# Patient Record
Sex: MALE | Race: WHITE | Employment: FULL TIME | URBAN - METROPOLITAN AREA
[De-identification: names, ages, dates, MRNs, and addresses within clinical notes are randomized per-mention and may not be internally consistent; named-entity substitution may affect disease eponyms.]

---

## 2017-05-04 ENCOUNTER — APPOINTMENT (OUTPATIENT)
Dept: CT IMAGING | Facility: CLINIC | Age: 57
End: 2017-05-04
Attending: EMERGENCY MEDICINE
Payer: COMMERCIAL

## 2017-05-04 ENCOUNTER — APPOINTMENT (OUTPATIENT)
Dept: GENERAL RADIOLOGY | Facility: CLINIC | Age: 57
End: 2017-05-04
Attending: EMERGENCY MEDICINE
Payer: COMMERCIAL

## 2017-05-04 ENCOUNTER — HOSPITAL ENCOUNTER (EMERGENCY)
Facility: CLINIC | Age: 57
Discharge: HOME | End: 2017-05-04
Attending: EMERGENCY MEDICINE | Admitting: EMERGENCY MEDICINE
Payer: COMMERCIAL

## 2017-05-04 VITALS
OXYGEN SATURATION: 97 % | SYSTOLIC BLOOD PRESSURE: 139 MMHG | HEART RATE: 56 BPM | TEMPERATURE: 97.8 F | WEIGHT: 264 LBS | HEIGHT: 72 IN | DIASTOLIC BLOOD PRESSURE: 75 MMHG | BODY MASS INDEX: 35.76 KG/M2 | RESPIRATION RATE: 18 BRPM

## 2017-05-04 DIAGNOSIS — R42 VERTIGO: ICD-10-CM

## 2017-05-04 LAB
ALBUMIN SERPL-MCNC: 3.7 G/DL (ref 3.4–5)
ALP SERPL-CCNC: 85 U/L (ref 40–150)
ALT SERPL W P-5'-P-CCNC: 74 U/L (ref 0–70)
ANION GAP SERPL CALCULATED.3IONS-SCNC: 4 MMOL/L (ref 3–14)
AST SERPL W P-5'-P-CCNC: 46 U/L (ref 0–45)
BASOPHILS # BLD AUTO: 0 10E9/L (ref 0–0.2)
BASOPHILS NFR BLD AUTO: 0.5 %
BILIRUB SERPL-MCNC: 1.4 MG/DL (ref 0.2–1.3)
BUN SERPL-MCNC: 18 MG/DL (ref 7–30)
CALCIUM SERPL-MCNC: 8.9 MG/DL (ref 8.5–10.1)
CHLORIDE SERPL-SCNC: 105 MMOL/L (ref 94–109)
CO2 SERPL-SCNC: 27 MMOL/L (ref 20–32)
CREAT SERPL-MCNC: 1 MG/DL (ref 0.66–1.25)
DIFFERENTIAL METHOD BLD: ABNORMAL
EOSINOPHIL # BLD AUTO: 0.2 10E9/L (ref 0–0.7)
EOSINOPHIL NFR BLD AUTO: 3 %
ERYTHROCYTE [DISTWIDTH] IN BLOOD BY AUTOMATED COUNT: 16 % (ref 10–15)
GFR SERPL CREATININE-BSD FRML MDRD: 77 ML/MIN/1.7M2
GLUCOSE SERPL-MCNC: 103 MG/DL (ref 70–99)
HCT VFR BLD AUTO: 41.3 % (ref 40–53)
HGB BLD-MCNC: 13 G/DL (ref 13.3–17.7)
IMM GRANULOCYTES # BLD: 0 10E9/L (ref 0–0.4)
IMM GRANULOCYTES NFR BLD: 0.2 %
INTERPRETATION ECG - MUSE: NORMAL
LYMPHOCYTES # BLD AUTO: 2.1 10E9/L (ref 0.8–5.3)
LYMPHOCYTES NFR BLD AUTO: 36.1 %
MCH RBC QN AUTO: 24.4 PG (ref 26.5–33)
MCHC RBC AUTO-ENTMCNC: 31.5 G/DL (ref 31.5–36.5)
MCV RBC AUTO: 78 FL (ref 78–100)
MONOCYTES # BLD AUTO: 0.8 10E9/L (ref 0–1.3)
MONOCYTES NFR BLD AUTO: 13.7 %
NEUTROPHILS # BLD AUTO: 2.7 10E9/L (ref 1.6–8.3)
NEUTROPHILS NFR BLD AUTO: 46.5 %
NRBC # BLD AUTO: 0 10*3/UL
NRBC BLD AUTO-RTO: 0 /100
PLATELET # BLD AUTO: 243 10E9/L (ref 150–450)
POTASSIUM SERPL-SCNC: 4.2 MMOL/L (ref 3.4–5.3)
PROT SERPL-MCNC: 7.4 G/DL (ref 6.8–8.8)
RBC # BLD AUTO: 5.32 10E12/L (ref 4.4–5.9)
SODIUM SERPL-SCNC: 136 MMOL/L (ref 133–144)
TROPONIN I SERPL-MCNC: NORMAL UG/L (ref 0–0.04)
TSH SERPL DL<=0.05 MIU/L-ACNC: 1.1 MU/L (ref 0.4–4)
WBC # BLD AUTO: 5.7 10E9/L (ref 4–11)

## 2017-05-04 PROCEDURE — 93005 ELECTROCARDIOGRAM TRACING: CPT

## 2017-05-04 PROCEDURE — 71020 XR CHEST 2 VW: CPT

## 2017-05-04 PROCEDURE — 99285 EMERGENCY DEPT VISIT HI MDM: CPT | Mod: 25

## 2017-05-04 PROCEDURE — 70450 CT HEAD/BRAIN W/O DYE: CPT | Mod: XS

## 2017-05-04 PROCEDURE — 70498 CT ANGIOGRAPHY NECK: CPT

## 2017-05-04 PROCEDURE — 25000128 H RX IP 250 OP 636: Performed by: EMERGENCY MEDICINE

## 2017-05-04 PROCEDURE — 84443 ASSAY THYROID STIM HORMONE: CPT | Performed by: EMERGENCY MEDICINE

## 2017-05-04 PROCEDURE — 25000131 ZZH RX MED GY IP 250 OP 636 PS 637: Performed by: EMERGENCY MEDICINE

## 2017-05-04 PROCEDURE — 80053 COMPREHEN METABOLIC PANEL: CPT | Performed by: EMERGENCY MEDICINE

## 2017-05-04 PROCEDURE — 25500064 ZZH RX 255 OP 636: Performed by: EMERGENCY MEDICINE

## 2017-05-04 PROCEDURE — 85025 COMPLETE CBC W/AUTO DIFF WBC: CPT | Performed by: EMERGENCY MEDICINE

## 2017-05-04 PROCEDURE — 84484 ASSAY OF TROPONIN QUANT: CPT | Performed by: EMERGENCY MEDICINE

## 2017-05-04 RX ORDER — MECLIZINE HYDROCHLORIDE 25 MG/1
25 TABLET ORAL ONCE
Status: COMPLETED | OUTPATIENT
Start: 2017-05-04 | End: 2017-05-04

## 2017-05-04 RX ORDER — IOPAMIDOL 755 MG/ML
500 INJECTION, SOLUTION INTRAVASCULAR ONCE
Status: COMPLETED | OUTPATIENT
Start: 2017-05-04 | End: 2017-05-04

## 2017-05-04 RX ORDER — ONDANSETRON 2 MG/ML
4 INJECTION INTRAMUSCULAR; INTRAVENOUS EVERY 30 MIN PRN
Status: DISCONTINUED | OUTPATIENT
Start: 2017-05-04 | End: 2017-05-04 | Stop reason: HOSPADM

## 2017-05-04 RX ORDER — MECLIZINE HCL 12.5 MG 12.5 MG/1
12.5 TABLET ORAL 4 TIMES DAILY PRN
Qty: 30 TABLET | Refills: 0 | Status: SHIPPED | OUTPATIENT
Start: 2017-05-04

## 2017-05-04 RX ORDER — ONDANSETRON 4 MG/1
4 TABLET, ORALLY DISINTEGRATING ORAL EVERY 8 HOURS PRN
Qty: 10 TABLET | Refills: 0 | Status: SHIPPED | OUTPATIENT
Start: 2017-05-04 | End: 2017-05-07

## 2017-05-04 RX ORDER — LIDOCAINE 40 MG/G
CREAM TOPICAL
Status: DISCONTINUED | OUTPATIENT
Start: 2017-05-04 | End: 2017-05-04 | Stop reason: HOSPADM

## 2017-05-04 RX ADMIN — MECLIZINE HYDROCHLORIDE 25 MG: 25 TABLET ORAL at 09:18

## 2017-05-04 RX ADMIN — SODIUM CHLORIDE 80 ML: 9 INJECTION, SOLUTION INTRAVENOUS at 09:39

## 2017-05-04 RX ADMIN — SODIUM CHLORIDE 1000 ML: 9 INJECTION, SOLUTION INTRAVENOUS at 11:15

## 2017-05-04 RX ADMIN — IOPAMIDOL 70 ML: 755 INJECTION, SOLUTION INTRAVENOUS at 09:39

## 2017-05-04 ASSESSMENT — ENCOUNTER SYMPTOMS
HEADACHES: 0
SHORTNESS OF BREATH: 0
CHILLS: 0
NUMBNESS: 0
VOMITING: 0
WEAKNESS: 0
DIZZINESS: 1
FEVER: 0

## 2017-05-04 NOTE — ED AVS SNAPSHOT
[facility] Emergency Department    [address] [place] [address]    Phone:  [phone]    Fax:  [phone]                                       Weston Wilkins   MRN: [id number]    Department:  [facility] Emergency Department   Date of Visit:  5/4/2017           Patient Information     Date Of Birth          1960        Your diagnoses for this visit were:     Vertigo        You were seen by Larissa Hernández MD.      Follow-up Information     Follow up with Souleymane Piper DO. Go in 5 days.    Specialty:  Family Practice    Contact information:    [facility]  [address]  [facility] [address] [phone]          Discharge Instructions         Vertigo (Unknown Cause)    In addition to helping with hearing, the inner ear is part of the balance center of your body. Problems with the inner ear can a false feeling of motion. This is called vertigo. Often, it feels as if you or the room is spinning. A vertigo attack may cause sudden nausea, vomiting and heavy sweating. Severe vertigo causes a loss of balance and can cause you to fall. During vertigo, small head movements and changes in body position will often make the symptoms worse. You may also have ringing in the ears called tinnitus.  An episode of vertigo may last seconds, minutes or hours. Once you are over the first episode, it may never come back. However, symptoms may return off and on.  The cause of your vertigo is not yet known. Possible causes of vertigo include:    Inflammation of the inner ear    Disease of the nerves to the inner ear    Movement of calcium particles in the inner ear    Poor blood flow to the balance centers of the brain    Migraine headaches  Home care    If symptoms are severe, rest quietly in bed. Change positions very slowly. There is usually one position that will feel best, such as lying on one side or lying on your back with your head slightly  raised on pillows.    Do not drive a car or work with dangerous machinery until symptoms have been gone for at least one week.    Take medicine as prescribed to relieve your symptoms. Unless another medicine was prescribed for symptoms of nausea, vomiting, and dizziness, you may use over-the-counter motion sickness pills. Ask your pharmacist for suggestions.  Follow-up care  Follow up with your healthcare provider or as directed. If you are referred to a specialist or for testing, make the appointment promptly.  When to seek medical advice  Call your healthcare provider if any of the following occur:    Fever of 100.4 F (38 C) or higher, or as directed by your healthcare provider    Vertigo worsens or is not controlled by prescribed medicine     Repeated vomiting not relieved by prescribed medicine     Severe headache    Confusion    Weakness of an arm or leg or one side of the face    Difficulty with speech or vision    Loss of consciousness     Seizure    4029-1534 [employer]. [address] [place] [address]. All rights reserved. This information is not intended as a substitute for professional medical care. Always follow your healthcare professional's instructions.          24 Hour Appointment Hotline       To make an appointment at any [place] clinic, call [phone] ([phone]). If you don't have a family doctor or clinic, we will help you find one. [place] clinics are conveniently located to serve the needs of you and your family.          ED Discharge Orders     PHYSICAL THERAPY REFERRAL       *This therapy referral will be filtered to a centralized scheduling office at [facility] and the patient will receive a call to schedule an appointment at a [place] location most convenient for them. *     [facility] provides Physical Therapy evaluation and treatment and many specialty services across the [place] system.  If requesting a  specialty program, please choose from the list below.    If you have not heard from the scheduling office within 2 business days, please call [phone] for all locations, with the exception of [facility], please call [phone].  Treatment: Evaluation & Treatment  Special Instructions/Modalities: none  Special Programs: Balance/Vestibular    Please be aware that coverage of these services is subject to the terms and limitations of your health insurance plan.  Call member services at your health plan with any benefit or coverage questions.      **Note to Provider:  If you are referring outside of [place] for the therapy appointment, please list the name of the location in the  special instructions  above, print the referral and give to the patient to schedule the appointment.                     Review of your medicines      START taking        Dose / Directions Last dose taken    meclizine 12.5 MG tablet   Commonly known as:  ANTIVERT   Dose:  12.5 mg   Quantity:  30 tablet        Take 1 tablet (12.5 mg) by mouth 4 times daily as needed for dizziness   Refills:  0        ondansetron 4 MG ODT tab   Commonly known as:  ZOFRAN ODT   Dose:  4 mg   Quantity:  10 tablet        Take 1 tablet (4 mg) by mouth every 8 hours as needed for nausea   Refills:  0          Our records show that you are taking the medicines listed below. If these are incorrect, please call your family doctor or clinic.        Dose / Directions Last dose taken    ALPRAZOLAM PO        Refills:  0        amoxicillin-clavulanate 1000-62.5 MG per 12 hr tablet   Commonly known as:  AUGMENTIN XR   Dose:  2 tablet        Take 2 tablets by mouth 2 times daily   Refills:  0        CITALOPRAM HYDROBROMIDE PO        Refills:  0        LISINOPRIL PO        Refills:  0        OMEPRAZOLE PO        Refills:  0        ZOLPIDEM TARTRATE PO        Refills:  0                Prescriptions were sent or printed at these locations (2 Prescriptions)                    Other Prescriptions                Printed at Department/Unit printer (2 of 2)         ondansetron (ZOFRAN ODT) 4 MG ODT tab               meclizine (ANTIVERT) 12.5 MG tablet                Procedures and tests performed during your visit     CBC with platelets differential    CT Head Neck Angio w/o & w Contrast    CT Head w/o Contrast    Chest XR,  PA & LAT    Comprehensive metabolic panel    EKG 12 lead    TSH    Troponin I      Orders Needing Specimen Collection     None      Pending Results     No orders found from 5/2/2017 to 5/5/2017.            Pending Culture Results     No orders found from 5/2/2017 to 5/5/2017.            Pending Results Instructions     If you had any lab results that were not finalized at the time of your Discharge, you can call the ED Lab Result RN at [phone]. You will be contacted by this team for any positive Lab results or changes in treatment. The nurses are available 7 days a week from 10A to 6:30P.  You can leave a message 24 hours per day and they will return your call.        Test Results From Your Hospital Stay        5/4/2017  9:14 AM      Component Results     Component Value Ref Range & Units Status    WBC 5.7 4.0 - 11.0 10e9/L Final    RBC Count 5.32 4.4 - 5.9 10e12/L Final    Hemoglobin 13.0 (L) 13.3 - 17.7 g/dL Final    Hematocrit 41.3 40.0 - 53.0 % Final    MCV 78 78 - 100 fl Final    MCH 24.4 (L) 26.5 - 33.0 pg Final    MCHC 31.5 31.5 - 36.5 g/dL Final    RDW 16.0 (H) 10.0 - 15.0 % Final    Platelet Count 243 150 - 450 10e9/L Final    Diff Method Automated Method  Final    % Neutrophils 46.5 % Final    % Lymphocytes 36.1 % Final    % Monocytes 13.7 % Final    % Eosinophils 3.0 % Final    % Basophils 0.5 % Final    % Immature Granulocytes 0.2 % Final    Nucleated RBCs 0 0 /100 Final    Absolute Neutrophil 2.7 1.6 - 8.3 10e9/L Final    Absolute Lymphocytes 2.1 0.8 - 5.3 10e9/L Final    Absolute Monocytes 0.8 0.0 - 1.3 10e9/L Final    Absolute Eosinophils 0.2 0.0 -  0.7 10e9/L Final    Absolute Basophils 0.0 0.0 - 0.2 10e9/L Final    Abs Immature Granulocytes 0.0 0 - 0.4 10e9/L Final    Absolute Nucleated RBC 0.0  Final         5/4/2017  9:33 AM      Component Results     Component Value Ref Range & Units Status    Sodium 136 133 - 144 mmol/L Final    Potassium 4.2 3.4 - 5.3 mmol/L Final    Chloride 105 94 - 109 mmol/L Final    Carbon Dioxide 27 20 - 32 mmol/L Final    Anion Gap 4 3 - 14 mmol/L Final    Glucose 103 (H) 70 - 99 mg/dL Final    Urea Nitrogen 18 7 - 30 mg/dL Final    Creatinine 1.00 0.66 - 1.25 mg/dL Final    GFR Estimate 77 >60 mL/min/1.7m2 Final    Non  GFR Calc    GFR Estimate If Black >90   GFR Calc   >60 mL/min/1.7m2 Final    Calcium 8.9 8.5 - 10.1 mg/dL Final    Bilirubin Total 1.4 (H) 0.2 - 1.3 mg/dL Final    Albumin 3.7 3.4 - 5.0 g/dL Final    Protein Total 7.4 6.8 - 8.8 g/dL Final    Alkaline Phosphatase 85 40 - 150 U/L Final    ALT 74 (H) 0 - 70 U/L Final    AST 46 (H) 0 - 45 U/L Final         5/4/2017  9:33 AM      Component Results     Component Value Ref Range & Units Status    Troponin I ES  0.000 - 0.045 ug/L Final    <0.015  The 99th percentile for upper reference range is 0.045 ug/L.  Troponin values in   the range of 0.045 - 0.120 ug/L may be associated with risks of adverse   clinical events.           5/4/2017  9:59 AM      Narrative     CT SCAN OF THE HEAD WITHOUT CONTRAST   5/4/2017 9:50 AM     HISTORY: New onset dizziness    TECHNIQUE:  Axial images of the head and coronal reformations without  IV contrast material.  Radiation dose for this scan was reduced using  automated exposure control, adjustment of the mA and/or kV according  to patient size, or iterative reconstruction technique.    COMPARISON: None.    FINDINGS:  The ventricles are normal in size, shape and configuration.   The brain parenchyma and subarachnoid spaces are normal. There is no  evidence of intracranial hemorrhage, mass, acute infarct or  anomaly.     The visualized portions of the sinuses and mastoids appear normal.  There is no evidence of trauma.        Impression     IMPRESSION: Normal CT scan of the head.      DAVIDA CALI MD         5/4/2017 11:16 AM      Narrative     CTA ANGIOGRAM HEAD AND NECK  5/4/2017 9:56 AM     HISTORY: Dizziness.    TECHNIQUE: Axial images were obtained through the head and neck  without and with intravenous contrast. 70 mL of Isovue-370 was given.  Multiplanar reconstructions were performed 3-D reconstructions off a  remote workstation for CT angiography were also acquired. Carotid  stenoses were evaluated by comparing the caliber of the proximal  internal carotid artery to the caliber of the distal internal carotid  artery. Radiation dose for this scan was reduced using automated  exposure control, adjustment of the mA and/or kV according to patient  size, or iterative reconstruction technique.    FINDINGS:    Brachiocephalic vessels: Normal.    Right carotid system: Normal.    Left carotid system: Normal.    Right vertebral artery: Normal.    Left vertebral artery: Normal.    [place]: Normal. Incidental note is made of an atretic right  A1 segment which is an anatomic variant.    Other findings: None.        Impression     IMPRESSION: Negative CT angiography of the head and neck.    DAVIDA CALI MD         5/4/2017  9:56 AM      Narrative     XR CHEST 2 VW 5/4/2017 9:51 AM    COMPARISON: 12/16/2015    HISTORY: Chest pain        Impression     IMPRESSION: Cardiac silhouette and pulmonary vasculature are within  normal limits. No focal airspace disease, pleural effusion or  pneumothorax.    JUAN M MCKEON         5/4/2017 10:52 AM      Component Results     Component Value Ref Range & Units Status    TSH 1.10 0.40 - 4.00 mU/L Final                Clinical Quality Measure: Blood Pressure Screening     Your blood pressure was checked while you were in the emergency department today. The last reading we obtained  "was  BP: 146/81 . Please read the guidelines below about what these numbers mean and what you should do about them.  If your systolic blood pressure (the top number) is less than 120 and your diastolic blood pressure (the bottom number) is less than 80, then your blood pressure is normal. There is nothing more that you need to do about it.  If your systolic blood pressure (the top number) is 120-139 or your diastolic blood pressure (the bottom number) is 80-89, your blood pressure may be higher than it should be. You should have your blood pressure rechecked within a year by a primary care provider.  If your systolic blood pressure (the top number) is 140 or greater or your diastolic blood pressure (the bottom number) is 90 or greater, you may have high blood pressure. High blood pressure is treatable, but if left untreated over time it can put you at risk for heart attack, stroke, or kidney failure. You should have your blood pressure rechecked by a primary care provider within the next 4 weeks.  If your provider in the emergency department today gave you specific instructions to follow-up with your doctor or provider even sooner than that, you should follow that instruction and not wait for up to 4 weeks for your follow-up visit.        Thank you for choosing [facility]       Thank you for choosing [facility] for your care. Our goal is always to provide you with excellent care. Hearing back from our patients is one way we can continue to improve our services. Please take a few minutes to complete the written survey that you may receive in the mail after you visit with us. Thank you!        [employer] Information     [employer] lets you send messages to your doctor, view your test results, renew your prescriptions, schedule appointments and more. To sign up, go to [email or web address] . Click on \"Log in\" on the left side of the screen, which will take you to the Welcome page. Then click on \"Sign up Now\" on the right side " of the page.     You will be asked to enter the access code listed below, as well as some personal information. Please follow the directions to create your username and password.     Your access code is: [email or web address]  Expires: 2017 11:43 AM     Your access code will  in 90 days. If you need help or a new code, please call your [place] clinic or [phone].        Care EveryWhere ID     This is your Care EveryWhere ID. This could be used by other organizations to access your [place] medical records  [id number]        After Visit Summary       This is your record. Keep this with you and show to your community pharmacist(s) and doctor(s) at your next visit.

## 2017-05-04 NOTE — ED PROVIDER NOTES
"  History     Chief Complaint:  Dizziness    HPI   Weston Wilkins is a 57 year old male with history of anxiety and depression who presents with a female  for evaluation of dizziness. The patient states that yesterday evening before going to bed around 0800 he experienced an episode of \"room spinning dizziness,\" which lasted for approximately 30 minutes. He states that these symptoms did not really resolve, but he simply went to bed. Before going to bed he took some percocet prescribed for a \"neck spur.\" Upon waking he states that he did not feel dizzy and was altogether back at baseline.     While driving to work about 1.5 PTA at 0715 however, his dizziness returned and he felt unsafe to drive. He pulled over on the side of the road and called EMS. This sensation lasted for 4-5 minutes and resolved spontaneously. While he was feeling dizzy he noted that the right side of his lower lip was tingling, but no similar sensation to his right upper lip, tongue, or extremities. The right side of his lower lip has continued and he does endorses recurrent dizziness when ambulating or twisting his head. The patient otherwise denies any headache, vision changes, chest pain, speech difficulty, confusion, shortness of breath, nausea, vomiting, or numbness, tingling, or weakness to his extremities.    Allergies:  Duloxetine  Sulfa drugs      Medications:    Lisinopril  Citalopram   Omeprazole  Zolpidem   Alprazolam   Augmentin XR    Past Medical History:    Anxiety   Depression   Hypertension  Hemochromatosis     Past Surgical History:    The patient does not have any pertinent past surgical history.     Family History:    No past pertinent family history.     Social History:  Negative for tobacco use.  Positive for alcohol use.    Marital Status:   [2]    Review of Systems   Constitutional: Negative for chills and fever.   Respiratory: Negative for shortness of breath.    Cardiovascular: Negative for chest " pain.   Gastrointestinal: Negative for vomiting.   Neurological: Positive for dizziness. Negative for syncope, weakness, numbness and headaches.   All other systems reviewed and are negative.    Physical Exam   First Vitals:  BP: (!) 143/96  Pulse: 56  Temp: 97.8  F (36.6  C)  Resp: 18  Height: 182.9 cm (6')  Weight: 119.7 kg (264 lb)  SpO2: 98 %      Physical Exam   Constitutional: He appears well-developed and well-nourished.   HENT:   Right Ear: External ear normal.   Left Ear: External ear normal.   Mouth/Throat: Oropharynx is clear and moist. No oropharyngeal exudate.   TM's clear bilaterally   Eyes: Conjunctivae are normal. Pupils are equal, round, and reactive to light. No scleral icterus.   Neck: Normal range of motion. Neck supple.   Cardiovascular: Normal rate, regular rhythm, normal heart sounds and intact distal pulses.  Exam reveals no gallop and no friction rub.    No murmur heard.  Pulmonary/Chest: Effort normal and breath sounds normal. No respiratory distress. He has no wheezes. He has no rales.   Abdominal: Soft. Bowel sounds are normal. He exhibits no distension and no mass. There is no tenderness.   Musculoskeletal: Normal range of motion. He exhibits no edema.   Lymphadenopathy:     He has no cervical adenopathy.   Neurological: He is alert. He has normal reflexes. No cranial nerve deficit.   Speech clear  5/5 strength x 4  Sensation to light touch normal in extremities  Neg drift  No cerebellar finidng   Skin: Skin is warm and dry. No rash noted.   Psychiatric: He has a normal mood and affect.     Emergency Department Course   ECG:  Indication: dizziness   Time: 0830  Vent. Rate 53 bpm. TN interval 152. QRS duration 98. QT/QTc 404/379. P-R-T axis 47 69 33. Sinus bradycardia. Otherwise normal ECG. Read time: 0836.      Imaging:  Radiographic findings were communicated with the patient who voiced understanding of the findings.    XR Chest (PA and LAT):   Cardiac silhouette and pulmonary  vasculature are within  normal limits. No focal airspace disease, pleural effusion or  pneumothorax. As per radiology.      CT Head without contrast:   Normal CT scan of the head. As per radiology.    CT Head/Neck angiogram:   Negative CT angiography of the head and neck. As per radiology.    Laboratory:  CBC: WBC: 5.7, HGB: 13.0(L), PLT: 243   CMP: Glucose 103(H), ALT 74(H), AST 46(H), o/w WNL (Creatinine: 1.00)     Troponin: <0.015    Interventions:  NS 1L IV   Meclizine 25 mg oral     Emergency Department Course:  Nursing notes and vitals reviewed. I performed an exam of the patient as documented above.      Blood drawn. This was sent to the lab for further testing, results above.    The patient was sent for a CXR, head/neck CT/CTA while in the emergency department, findings above.      0949 The patient was ambulated here in the emergency department without difficulty or dizziness.      1002 I reevaluated the patient and provided an update in regards to his ED course.       Findings and plan explained to the Patient. Patient discharged home with instructions regarding supportive care, medications, and reasons to return. The importance of close follow-up was reviewed.     I personally reviewed the laboratory results with the Patient and answered all related questions prior to discharge.      Impression & Plan    Medical Decision Making:  Weston Wilkins is a 57 year old male who presents with an intermittent spinning sensation and nausea; currently symptoms are not as severe and nausea has somewhat subsided. He did not have any neurologic deficits. He has subjective paraesthesias to the right lower lip. There was no paresthesias to other body parts. His symptoms resolved with Zofran and Meclizine here, he did have one episode of dizziness in the bathroom again, but when he returned his symptoms resolved. He does have a history of neck problems and we did obtain a CT/CTA to rule out dissection. So far things are  negative, he is reassured, and is sent to physical therapy for vestibular therapy. Also recommended follow up with his primary care physician. The patient will otherwise feels comfortable going home with a course of Zofran and meclizine; should symptoms worsen he is asked to return to the ED.     Diagnosis:  1. (R42) BPPV     Discharge Medications:  New Prescriptions    MECLIZINE (ANTIVERT) 12.5 MG TABLET    Take 1 tablet (12.5 mg) by mouth 4 times daily as needed for dizziness    ONDANSETRON (ZOFRAN ODT) 4 MG ODT TAB    Take 1 tablet (4 mg) by mouth every 8 hours as needed for nausea     Gil PARKER, am serving as a scribe on 5/4/2017 at 8:37 AM to personally document services performed by Larissa Hernández MD based on my observations and the provider's statements to me.     5/4/2017   [facility] EMERGENCY DEPARTMENT       Larissa Hernández MD  05/04/17 [id number]

## 2017-05-04 NOTE — ED NOTES
Pt presents with multiple complaints. Pt states he had some dizziness last night. Today the dizziness has gotten a lot worse. Pt is c/o nausea and right lower lip numbness. Pt states he has a neck spur and always has neck/shoulder pain and wonders if the numbness is related. Pt is A&O, ABC's intact.

## 2017-05-04 NOTE — DISCHARGE INSTRUCTIONS
Vertigo (Unknown Cause)    In addition to helping with hearing, the inner ear is part of the balance center of your body. Problems with the inner ear can a false feeling of motion. This is called vertigo. Often, it feels as if you or the room is spinning. A vertigo attack may cause sudden nausea, vomiting and heavy sweating. Severe vertigo causes a loss of balance and can cause you to fall. During vertigo, small head movements and changes in body position will often make the symptoms worse. You may also have ringing in the ears called tinnitus.  An episode of vertigo may last seconds, minutes or hours. Once you are over the first episode, it may never come back. However, symptoms may return off and on.  The cause of your vertigo is not yet known. Possible causes of vertigo include:    Inflammation of the inner ear    Disease of the nerves to the inner ear    Movement of calcium particles in the inner ear    Poor blood flow to the balance centers of the brain    Migraine headaches  Home care    If symptoms are severe, rest quietly in bed. Change positions very slowly. There is usually one position that will feel best, such as lying on one side or lying on your back with your head slightly raised on pillows.    Do not drive a car or work with dangerous machinery until symptoms have been gone for at least one week.    Take medicine as prescribed to relieve your symptoms. Unless another medicine was prescribed for symptoms of nausea, vomiting, and dizziness, you may use over-the-counter motion sickness pills. Ask your pharmacist for suggestions.  Follow-up care  Follow up with your healthcare provider or as directed. If you are referred to a specialist or for testing, make the appointment promptly.  When to seek medical advice  Call your healthcare provider if any of the following occur:    Fever of 100.4 F (38 C) or higher, or as directed by your healthcare provider    Vertigo worsens or is not controlled  by prescribed medicine     Repeated vomiting not relieved by prescribed medicine     Severe headache    Confusion    Weakness of an arm or leg or one side of the face    Difficulty with speech or vision    Loss of consciousness     Seizure    8917-8003 [employer]. [address] [place] [address], [place] [address]. All rights reserved. This information is not intended as a substitute for professional medical care. Always follow your healthcare professional's instructions.

## 2017-05-08 ENCOUNTER — HOSPITAL ENCOUNTER (OUTPATIENT)
Dept: PHYSICAL THERAPY | Facility: CLINIC | Age: 57
Setting detail: THERAPIES SERIES
End: 2017-05-08
Attending: EMERGENCY MEDICINE
Payer: COMMERCIAL

## 2017-05-08 PROCEDURE — 40000840 ZZHC STATISTIC PT VESTIBULAR VISIT: Performed by: PHYSICAL THERAPIST

## 2017-05-08 PROCEDURE — 97162 PT EVAL MOD COMPLEX 30 MIN: CPT | Mod: GP | Performed by: PHYSICAL THERAPIST

## 2017-05-08 NOTE — PROGRESS NOTES
" 05/08/17 1000   Quick Adds   Quick Adds Vestibular Eval   Type of Visit Initial OP PT Evaluation   General Information   Start of Care Date 05/08/17   Referring Physician Larissa Hernández MD   Orders Evaluate and Treat as Indicated   Order Date 05/04/17   Medical Diagnosis vertigo   Onset of illness/injury or Date of Surgery 05/03/17   Surgical/Medical history reviewed Yes   Pertinent history of current vestibular problem (include personal factors and/or comorbidities that impact the POC)  Anxiety;Depression;Migraines;Prior concussion(s)   Pertinent history of current problem (include personal factors and/or comorbidities that impact the POC) Patient reports he had a HA on Wednesday night and a \"dizzy spell.\" 'The next morning while driving to work he was feeling dizzy in waves. Eventually he had to pull off the road and called 911 as he thought he was having a stroke. He was seen in the ED. CT scan and roya was negative. He was diagnosed with vertigo and discharged home with a script for Meclizine, Zofran and PT for vestibular rehab. He has been taking the Meclizine every 6 hours since but has not been taking the Zofran. He is not sure if it is helping. He is sleeping alot, unable to work or drive. He denies recent medication changes or recent illness. He reports hearing has not changed but has had a couple epsiodes of ringing. His balance seems to be off constantly, worse during the vertigo spells. He is walking with assist of walls and furnature. He report several \"big ones\" when describing epsiodes of vertigo, each day lasting mins at a time. He does have a h/o a bone spur in his C-spine that was not removed during his decompression surgery in 2009 as it was reportedly too close to the nerve. He wonders if this is causing his vertigo.   Pertinent Visual History  no recent vision changes, wears \"cheaters\" for reading only. Has had lasik in past.   Prior level of function comment independent with all mobility, no " AD   Diagnostic Tests CT Scan   CT Results unremarkable   Previous/Current Treatment Medication(s)   Improvement after medication Other  (patient not sure it is helping much)   Current Community Support Family/friend caregiver  (spouse)   Patient role/Employment history Employed  (manufacturing plant, on computer. off work now d/t vertigo.)   Living environment House/[facility]   Home/Community Accessibility Comments stairs ok with support. Not driving now due to dizziness.   Current Assistive Devices (none)   ADL Devices (none)   Patient/Family Goals Statement resolve dizziness, improve balance, return to work, driving   Fall Risk Screen   Fall screen completed by PT   Per patient - Fall 2 or more times in past year? No   Per patient - Fall with injury in past year? No   Is patient a fall risk? Yes   Functional Scales   Functional Scales and Outcomes DHI 66/100   Pain   Patient currently in pain Yes   Pain location neck   Pain rating 4/10   Additional pain locations? Pain location 2   Pain location 2 shoulder B   Pain rating 2 4/10   Cognitive Status Examination   Orientation orientation to person, place and time   Level of Consciousness alert   Follows Commands and Answers Questions 100% of the time   Personal Safety and Judgment intact   Posture   Posture Forward head position   Strength   Strength Comments C6 on L 4/5 all other cervical myotomes strong, 5/5, B   Bed Mobility   Bed Mobility Comments independent   Transfer Skills   Transfer Comments independent   Gait   Gait Comments slow, guarded with short step length B. Landing with foot flat and at times toe strike at initial contact   Gait Special Tests   Gait Special Tests DYNAMIC GAIT INDEX   Gait Special Tests Dynamic Gait Index   Comments 5/12 on 4 item DGI with 9 or less indicating increased fall risk   Sensory Examination   Sensory Perception no deficits were identified   Coordination   Coordination no deficits were identified   Cervicogenic Screen   Neck  ROM sufficient for positional testing   Oculomotor Exam   Smooth Pursuit Normal   Smooth Pursuit Comment some blurring, per pt   Saccades Normal   VOR Normal   Rapid Head Thrust Normal   Infrared Goggle Exam or Frenzel Lense Exam   Vestibular Suppressant in Last 24 Hours? Yes  (last dose at 6AM, taking every 6 hours)   Exam completed with Infrared Goggles   Spontaneous Nystagmus Other   Gaze Evoked Nystagmus Horizontal R   Gaze Evoked Nystagmus comments with R gaze only   Head Shake Horizontal Nystagmus Negative   [place]-Hallpike (right) Horizontal R   [place]-Hallpike (right) comments asymptomatic   [place]-Hallpike (Left) Negative   HSCC Supine Roll Test (Right) Negative   HSCC Supine Roll Test (Left) Negative   Planned Therapy Interventions   Planned Therapy Interventions balance training;neuromuscular re-education;other (see comments)   Planned Therapy Interventions Comment CRM if indicated   Clinical Impression   Criteria for Skilled Therapeutic Interventions Met yes, treatment indicated   PT Diagnosis gait instability, vertigo   Influenced by the following impairments intermittent dizziness, imbalance   Functional limitations due to impairments transfers, gait, work tasks, driving, participation in social activities, household activities   Clinical Presentation Evolving/Changing   Clinical Presentation Rationale evolving symptoms, vary without clear trigger    Clinical Decision Making (Complexity) Moderate complexity   Therapy Frequency 2 times/Week  (reduced frequency as indicated)   Predicted Duration of Therapy Intervention (days/wks) 4 weeks   Risk & Benefits of therapy have been explained Yes   Patient, Family & other staff in agreement with plan of care Yes   Clinical Impression Comments Unable to reproduce vertigo in session today with negative IR and oculomotor exam. It is possible that s/s are being masked by meclizine. Patient will be re-assessed with IR at upcoming appt to more definitively rule in or out the  "vestibular system.   Education Assessment   Barriers to Learning No barriers   GOALS   PT Eval Goals 1;2   Goal 1   Goal Identifier 1   Goal Description Patient will score at least 10/12 on 4 item DGI indicating improved gait stability and reduced fall risk for safe return to household and work tasks.   Target Date 06/05/17   Goal 2   Goal Identifier 2   Goal Description Patient will score less than 20 on DHI indicating reduced dizziness for full participation in normal daily, work and social activities.   Target Date 06/05/17   Total Evaluation Time   Total Evaluation Time (Minutes) 60      05/08/17 1000   Quick Adds   Quick Adds Vestibular Eval   Type of Visit Initial OP PT Evaluation   General Information   Start of Care Date 05/08/17   Referring Physician Larissa Hernández MD   Orders Evaluate and Treat as Indicated   Order Date 05/04/17   Medical Diagnosis vertigo   Onset of illness/injury or Date of Surgery 05/03/17   Surgical/Medical history reviewed Yes   Pertinent history of current vestibular problem (include personal factors and/or comorbidities that impact the POC)  Anxiety;Depression;Migraines;Prior concussion(s)   Pertinent history of current problem (include personal factors and/or comorbidities that impact the POC) Patient reports he had a HA on Wednesday night and a \"dizzy spell.\" 'The next morning while driving to work he was feeling dizzy in waves. Eventually he had to pull off the road and called 911 as he thought he was having a stroke. He was seen in the ED. CT scan and ryoa was negative. He was diagnosed with vertigo and discharged home with a script for Meclizine, Zofran and PT for vestibular rehab. He has been taking the Meclizine every 6 hours since but has not been taking the Zofran. He is not sure if it is helping. He is sleeping alot, unable to work or drive. He denies recent medication changes or recent illness. He reports hearing has not changed but has had a couple epsiodes of ringing. " "His balance seems to be off constantly, worse during the vertigo spells. He is walking with assist of walls and furnature. He report several \"big ones\" when describing epsiodes of vertigo, each day lasting mins at a time. He does have a h/o a bone spur in his C-spine that was not removed during his decompression surgery in 2009 as it was reportedly too close to the nerve. He wonders if this is causing his vertigo.   Pertinent Visual History  no recent vision changes, wears \"cheaters\" for reading only. Has had lasik in past.   Prior level of function comment independent with all mobility, no AD   Diagnostic Tests CT Scan   CT Results unremarkable   Previous/Current Treatment Medication(s)   Improvement after medication Other  (patient not sure it is helping much)   Current Community Support Family/friend caregiver  (spouse)   Patient role/Employment history Employed  (manufacturing plant, on computer. off work now d/t vertigo.)   Living environment House/townhome   Home/Community Accessibility Comments stairs ok with support. Not driving now due to dizziness.   Current Assistive Devices (none)   ADL Devices (none)   Patient/Family Goals Statement resolve dizziness, improve balance, return to work, driving   Fall Risk Screen   Fall screen completed by PT   Per patient - Fall 2 or more times in past year? No   Per patient - Fall with injury in past year? No   Is patient a fall risk? Yes   Functional Scales   Functional Scales and Outcomes DHI 66/100   Pain   Patient currently in pain Yes   Pain location neck   Pain rating 4/10   Additional pain locations? Pain location 2   Pain location 2 shoulder B   Pain rating 2 4/10   Cognitive Status Examination   Orientation orientation to person, place and time   Level of Consciousness alert   Follows Commands and Answers Questions 100% of the time   Personal Safety and Judgment intact   Posture   Posture Forward head position   Strength   Strength Comments C6 on L 4/5 all other " cervical myotomes strong, 5/5, B   Bed Mobility   Bed Mobility Comments independent   Transfer Skills   Transfer Comments independent   Gait   Gait Comments slow, guarded with short step length B. Landing with foot flat and at times toe strike at initial contact   Gait Special Tests   Gait Special Tests DYNAMIC GAIT INDEX   Gait Special Tests Dynamic Gait Index   Comments 5/12 on 4 item DGI with 9 or less indicating increased fall risk   Sensory Examination   Sensory Perception no deficits were identified   Coordination   Coordination no deficits were identified   Cervicogenic Screen   Neck ROM sufficient for positional testing   Oculomotor Exam   Smooth Pursuit Normal   Smooth Pursuit Comment some blurring, per pt   Saccades Normal   VOR Normal   Rapid Head Thrust Normal   Infrared Goggle Exam or Frenzel Lense Exam   Vestibular Suppressant in Last 24 Hours? Yes  (last dose at 6AM, taking every 6 hours)   Exam completed with Infrared Goggles   Spontaneous Nystagmus Other   Gaze Evoked Nystagmus Horizontal R   Gaze Evoked Nystagmus comments with R gaze only   Head Shake Horizontal Nystagmus Negative   [place]-Hallpike (right) Horizontal R   [place]-Hallpike (right) comments asymptomatic   Rekha-Hallpike (Left) Negative   HSCC Supine Roll Test (Right) Negative   HSCC Supine Roll Test (Left) Negative   Planned Therapy Interventions   Planned Therapy Interventions balance training;neuromuscular re-education;other (see comments)   Planned Therapy Interventions Comment CRM if indicated   Clinical Impression   Criteria for Skilled Therapeutic Interventions Met yes, treatment indicated   PT Diagnosis gait instability, vertigo   Influenced by the following impairments intermittent dizziness, imbalance   Functional limitations due to impairments transfers, gait, work tasks, driving, participation in social activities, household activities   Clinical Presentation Evolving/Changing   Clinical Presentation Rationale evolving symptoms,  vary without clear trigger    Clinical Decision Making (Complexity) Moderate complexity   Therapy Frequency 2 times/Week  (reduced frequency as indicated)   Predicted Duration of Therapy Intervention (days/wks) 4 weeks   Risk & Benefits of therapy have been explained Yes   Patient, Family & other staff in agreement with plan of care Yes   Clinical Impression Comments Unable to reproduce vertigo in session today with negative IR and oculomotor exam. It is possible that s/s are being masked by meclizine. Patient will be re-assessed with IR at upcoming appt to more definitively rule in or out the vestibular system.   Education Assessment   Barriers to Learning No barriers   GOALS   PT Eval Goals 1;2   Goal 1   Goal Identifier 1   Goal Description Patient will score at least 10/12 on 4 item DGI indicating improved gait stability and reduced fall risk for safe return to household and work tasks.   Target Date 06/05/17   Goal 2   Goal Identifier 2   Goal Description Patient will score less than 20 on DHI indicating reduced dizziness for full participation in normal daily, work and social activities.   Target Date 06/05/17   Total Evaluation Time   Total Evaluation Time (Minutes) 60

## 2017-05-09 ENCOUNTER — HOSPITAL ENCOUNTER (OUTPATIENT)
Dept: PHYSICAL THERAPY | Facility: CLINIC | Age: 57
Setting detail: THERAPIES SERIES
End: 2017-05-09
Attending: EMERGENCY MEDICINE
Payer: COMMERCIAL

## 2017-05-09 PROCEDURE — 95992 CANALITH REPOSITIONING PROC: CPT | Mod: GP | Performed by: PHYSICAL THERAPIST

## 2017-05-09 PROCEDURE — 97112 NEUROMUSCULAR REEDUCATION: CPT | Mod: GP | Performed by: PHYSICAL THERAPIST

## 2017-05-09 PROCEDURE — 40000840 ZZHC STATISTIC PT VESTIBULAR VISIT: Performed by: PHYSICAL THERAPIST

## 2017-05-18 ENCOUNTER — HOSPITAL ENCOUNTER (OUTPATIENT)
Dept: PHYSICAL THERAPY | Facility: CLINIC | Age: 57
Setting detail: THERAPIES SERIES
End: 2017-05-18
Attending: EMERGENCY MEDICINE
Payer: COMMERCIAL

## 2017-05-18 PROCEDURE — 97112 NEUROMUSCULAR REEDUCATION: CPT | Mod: GP | Performed by: PHYSICAL THERAPIST

## 2017-05-18 PROCEDURE — 95992 CANALITH REPOSITIONING PROC: CPT | Mod: GP | Performed by: PHYSICAL THERAPIST

## 2017-05-18 PROCEDURE — 40000840 ZZHC STATISTIC PT VESTIBULAR VISIT: Performed by: PHYSICAL THERAPIST

## 2017-05-25 ENCOUNTER — HOSPITAL ENCOUNTER (OUTPATIENT)
Dept: PHYSICAL THERAPY | Facility: CLINIC | Age: 57
Setting detail: THERAPIES SERIES
End: 2017-05-25
Attending: EMERGENCY MEDICINE
Payer: COMMERCIAL

## 2017-05-25 PROCEDURE — 95992 CANALITH REPOSITIONING PROC: CPT | Mod: GP | Performed by: PHYSICAL THERAPIST

## 2017-05-25 PROCEDURE — 40000840 ZZHC STATISTIC PT VESTIBULAR VISIT: Performed by: PHYSICAL THERAPIST

## 2017-05-25 PROCEDURE — 97112 NEUROMUSCULAR REEDUCATION: CPT | Mod: GP | Performed by: PHYSICAL THERAPIST

## 2017-06-01 ENCOUNTER — HOSPITAL ENCOUNTER (OUTPATIENT)
Dept: PHYSICAL THERAPY | Facility: CLINIC | Age: 57
Setting detail: THERAPIES SERIES
End: 2017-06-01
Attending: EMERGENCY MEDICINE
Payer: COMMERCIAL

## 2017-06-01 PROCEDURE — 40000840 ZZHC STATISTIC PT VESTIBULAR VISIT: Performed by: PHYSICAL THERAPIST

## 2017-06-01 PROCEDURE — 97112 NEUROMUSCULAR REEDUCATION: CPT | Mod: GP | Performed by: PHYSICAL THERAPIST

## 2018-02-28 NOTE — ADDENDUM NOTE
Encounter addended by: Latricia Gomez PT on: 2/28/2018 10:13 AM<BR>     Actions taken: Sign clinical note, Episode resolved

## 2018-02-28 NOTE — PROGRESS NOTES
"Outpatient Physical Therapy Discharge Note     Patient: Weston Wilkins  : 1960    Beginning/End Dates of Reporting Period:  17-17. Patient was seen a total of 5 visits for vestibular and balance rehab, CRM.    Referring Provider: Larissa Hernández MD    Therapy Diagnosis: gait instability and vertigo     Client Self Report: Patient reports a few epsiodes of brief lightheadedness but not really any spinning. Carpenter Daroff exercises did not cause any dizziness. He [place] [employer] [place] the [place] and noted he was unstable in that busy environment. He ended up purchasing a $2 cane there to give him some added stability. He seems to have adapted to the busy environment at work and has not problem there. His boss remarked recently that he seems to have his energy back.    Objective Measurements:  Objective Measure: 4 item DGI  Details:   Objective Measure: mCTSIB  Details: 30\" in all conditions  Objective Measure: IR exam  Details: horizontal nystagmus in R roll test and R hallpike, persistent with no report of vertigo.       Goals:  Goal Identifier 1   Goal Description Patient will score at least 10/12 on 4 item DGI indicating improved gait stability and reduced fall risk for safe return to household and work tasks.    Target Date 17   Date Met   17   Progress:     Goal Identifier 2   Goal Description Patient will score less than 20 on DHI indicating reduced dizziness for full participation in normal daily, work and social activities.   Target Date 17   Date Met      Progress: NT     Goal Identifier 3   Goal Description Patient will have normal DVA at 1 Hz, reading at least at 20/15, to be able to drive safely and return to work.   Target Date 17   Date Met      Progress:. DVA NT but pt without dizziness at work or while driving.       Progress Toward Goals:   Progress this reporting period: Patient was reporting resolution of dizziness with driving and as he moved about " his day, at home, at work and in the community. Balance is WNL. Gait stability with no indication of increased fall risk.    Plan:  Discharge from therapy.    Discharge:    Reason for Discharge: Resolution of vertigo, normal balance. Patient was placed on 4 week trial discharge with instruction to follow up if vertigo returned. He has not done so; therefore, is discharged.    Discharge Plan: Patient to continue home program.

## 2023-10-30 ENCOUNTER — NURSE TRIAGE (OUTPATIENT)
Dept: NURSING | Facility: CLINIC | Age: 63
End: 2023-10-30
Payer: COMMERCIAL

## 2023-10-31 NOTE — TELEPHONE ENCOUNTER
Patients wife called.      Her  called and said he is concerned he might be having retinal detachment.    Wife is not with .  She tried calling him but he didn't answer.    This is occurring in his left eye.  At pm he developed squiggly lines, within the last hour he has developed black spots in his vision on his left eye.    Wife is unsure if pain or headache.    Wife was calling to know where to be seen.  She has been calling [facility] and they are closed.    I said the ED can evaluate.  She asked if [facility] can.  I called and spoke with Benito and he said yes they can evaluate for that.    Called wife back and left message, she didn't answer.  I explained [facility] ED can evaluate and you can just show up, no appointment needed.    Gladis Rodriguez RN   10/30/23 8:24 PM  [facility] Nurse Advisor    Wife called back, gave her the above information and she will take him there now.    Gladis Rodriguez RN   10/30/23 8:27 PM  [facility] Nurse Advisor  Reason for Disposition   [1] Blurred vision or visual changes AND [2] present now AND [3] sudden onset or new (e.g., minutes, hours, days)  (Exception: Seeing floaters / black specks OR previously diagnosed migraine headaches with same symptoms.)    Additional Information   Negative: Weakness of the face, arm or leg on one side of the body   Negative: Followed getting substance in the eye   Negative: Foreign body stuck in the eye   Negative: Followed an eye injury   Negative: Followed sun lamp or sun exposure (UV keratitis)   Negative: Yellow or green discharge (pus) in the eye   Negative: Pregnant   Negative: Postpartum (from 0 to 6 weeks after delivery)   Negative: Complete loss of vision in one or both eyes   Negative: SEVERE eye pain   Negative: SEVERE headache   Negative: Double vision    Protocols used: Vision Loss or Change-A-AH